# Patient Record
Sex: MALE | Employment: OTHER | URBAN - METROPOLITAN AREA
[De-identification: names, ages, dates, MRNs, and addresses within clinical notes are randomized per-mention and may not be internally consistent; named-entity substitution may affect disease eponyms.]

---

## 2018-12-14 ENCOUNTER — CONSULTATION (OUTPATIENT)
Dept: URBAN - METROPOLITAN AREA CLINIC 11 | Facility: CLINIC | Age: 73
End: 2018-12-14

## 2018-12-19 ASSESSMENT — TONOMETRY
OS_IOP_MMHG: 19
OD_IOP_MMHG: 21

## 2018-12-19 ASSESSMENT — VISUAL ACUITY
OS_SC: 20/20-1
OD_SC: 20/30-2

## 2020-02-21 NOTE — PATIENT DISCUSSION
Discussed the options of vitrectomy surgery with membrane peeling vs. observation alone. Risks, benefits and alternatives were discussed in detail. All questions were answered.
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
Educational brochures given to patient.
Greater than 50% of exam spent in face to face dialogue with Mr. Warren Coleman. 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 I have recommended vitrectomy surgery to remove the visually significant membrane.  All risks and benefits discussed at great length with Mr. Warren Coleman and he would like to schedule surgery in the near future.
Membrane is visually significant.
No retinal detachment or retinal tear noted.
Recommended observation.
Recommended surgery.
left knee and right shoulder